# Patient Record
Sex: FEMALE | Race: WHITE | NOT HISPANIC OR LATINO | URBAN - METROPOLITAN AREA
[De-identification: names, ages, dates, MRNs, and addresses within clinical notes are randomized per-mention and may not be internally consistent; named-entity substitution may affect disease eponyms.]

---

## 2019-04-23 ENCOUNTER — TRANSCRIBE ORDERS (OUTPATIENT)
Dept: ADMINISTRATIVE | Facility: HOSPITAL | Age: 39
End: 2019-04-23

## 2019-04-23 DIAGNOSIS — Z12.31 VISIT FOR SCREENING MAMMOGRAM: Primary | ICD-10-CM

## 2022-02-11 ENCOUNTER — TRANSCRIBE ORDERS (OUTPATIENT)
Dept: ADMINISTRATIVE | Facility: HOSPITAL | Age: 42
End: 2022-02-11

## 2022-02-11 DIAGNOSIS — Z12.31 VISIT FOR SCREENING MAMMOGRAM: Primary | ICD-10-CM

## 2022-02-14 ENCOUNTER — APPOINTMENT (OUTPATIENT)
Dept: MAMMOGRAPHY | Facility: HOSPITAL | Age: 42
End: 2022-02-14

## 2022-04-08 ENCOUNTER — HOSPITAL ENCOUNTER (OUTPATIENT)
Dept: MAMMOGRAPHY | Facility: HOSPITAL | Age: 42
Discharge: HOME | End: 2022-04-08
Admitting: OBSTETRICS & GYNECOLOGY

## 2022-04-08 DIAGNOSIS — Z12.31 VISIT FOR SCREENING MAMMOGRAM: ICD-10-CM

## 2022-04-08 PROCEDURE — 77063 BREAST TOMOSYNTHESIS BI: CPT | Performed by: RADIOLOGY

## 2022-04-08 PROCEDURE — 77067 SCR MAMMO BI INCL CAD: CPT

## 2022-04-08 PROCEDURE — 77063 BREAST TOMOSYNTHESIS BI: CPT

## 2022-04-08 PROCEDURE — 77067 SCR MAMMO BI INCL CAD: CPT | Performed by: RADIOLOGY

## 2023-03-28 ENCOUNTER — TRANSCRIBE ORDERS (OUTPATIENT)
Dept: ADMINISTRATIVE | Facility: HOSPITAL | Age: 43
End: 2023-03-28
Payer: COMMERCIAL

## 2023-03-28 DIAGNOSIS — Z12.31 VISIT FOR SCREENING MAMMOGRAM: Primary | ICD-10-CM

## 2023-04-18 ENCOUNTER — HOSPITAL ENCOUNTER (OUTPATIENT)
Dept: MAMMOGRAPHY | Facility: HOSPITAL | Age: 43
Discharge: HOME | End: 2023-04-18
Admitting: OBSTETRICS & GYNECOLOGY
Payer: COMMERCIAL

## 2023-04-18 DIAGNOSIS — Z12.31 VISIT FOR SCREENING MAMMOGRAM: ICD-10-CM

## 2023-04-18 PROCEDURE — 77063 BREAST TOMOSYNTHESIS BI: CPT

## 2023-04-18 PROCEDURE — 77067 SCR MAMMO BI INCL CAD: CPT

## 2023-12-06 ENCOUNTER — HOSPITAL ENCOUNTER (EMERGENCY)
Facility: HOSPITAL | Age: 43
Discharge: HOME | End: 2023-12-06
Attending: EMERGENCY MEDICINE
Payer: COMMERCIAL

## 2023-12-06 ENCOUNTER — OFFICE VISIT (OUTPATIENT)
Dept: GASTROENTEROLOGY | Facility: CLINIC | Age: 43
End: 2023-12-06
Payer: COMMERCIAL

## 2023-12-06 ENCOUNTER — APPOINTMENT (OUTPATIENT)
Dept: CT IMAGING | Facility: HOSPITAL | Age: 43
End: 2023-12-06
Payer: COMMERCIAL

## 2023-12-06 VITALS
HEIGHT: 63 IN | DIASTOLIC BLOOD PRESSURE: 88 MMHG | RESPIRATION RATE: 22 BRPM | TEMPERATURE: 97.8 F | WEIGHT: 175 LBS | SYSTOLIC BLOOD PRESSURE: 124 MMHG | BODY MASS INDEX: 31.01 KG/M2 | OXYGEN SATURATION: 98 % | HEART RATE: 103 BPM

## 2023-12-06 VITALS
HEIGHT: 63 IN | SYSTOLIC BLOOD PRESSURE: 142 MMHG | HEART RATE: 73 BPM | DIASTOLIC BLOOD PRESSURE: 85 MMHG | BODY MASS INDEX: 33.45 KG/M2 | WEIGHT: 188.8 LBS

## 2023-12-06 DIAGNOSIS — K80.50 BILIARY COLIC: ICD-10-CM

## 2023-12-06 DIAGNOSIS — R11.2 NAUSEA AND VOMITING, UNSPECIFIED VOMITING TYPE: ICD-10-CM

## 2023-12-06 DIAGNOSIS — D72.829 LEUKOCYTOSIS, UNSPECIFIED TYPE: ICD-10-CM

## 2023-12-06 DIAGNOSIS — Z87.898 HISTORY OF DIARRHEA: ICD-10-CM

## 2023-12-06 DIAGNOSIS — R11.0 NAUSEA: ICD-10-CM

## 2023-12-06 DIAGNOSIS — K59.00 CONSTIPATION, UNSPECIFIED CONSTIPATION TYPE: ICD-10-CM

## 2023-12-06 DIAGNOSIS — R10.13 DYSPEPSIA: Primary | ICD-10-CM

## 2023-12-06 DIAGNOSIS — R74.8 ELEVATED LIVER ENZYMES: ICD-10-CM

## 2023-12-06 DIAGNOSIS — R10.13 EPIGASTRIC PAIN: Primary | ICD-10-CM

## 2023-12-06 LAB
ALBUMIN SERPL-MCNC: 4 G/DL (ref 3.5–5.2)
ALBUMIN/GLOB SERPL: 1.5 G/DL
ALP SERPL-CCNC: 106 U/L (ref 39–117)
ALT SERPL W P-5'-P-CCNC: 63 U/L (ref 1–33)
ANION GAP SERPL CALCULATED.3IONS-SCNC: 10 MMOL/L (ref 5–15)
AST SERPL-CCNC: 38 U/L (ref 1–32)
B-HCG UR QL: NEGATIVE
BACTERIA UR QL AUTO: ABNORMAL /HPF
BASOPHILS # BLD AUTO: 0.03 10*3/MM3 (ref 0–0.2)
BASOPHILS NFR BLD AUTO: 0.3 % (ref 0–1.5)
BILIRUB SERPL-MCNC: 0.3 MG/DL (ref 0–1.2)
BILIRUB UR QL STRIP: NEGATIVE
BUN SERPL-MCNC: 10 MG/DL (ref 6–20)
BUN/CREAT SERPL: 12.5 (ref 7–25)
CALCIUM SPEC-SCNC: 9.3 MG/DL (ref 8.6–10.5)
CHLORIDE SERPL-SCNC: 103 MMOL/L (ref 98–107)
CHOLEST SERPL-MCNC: 147 MG/DL (ref 0–200)
CLARITY UR: CLEAR
CO2 SERPL-SCNC: 25 MMOL/L (ref 22–29)
COD CRY URNS QL: ABNORMAL /HPF
COLOR UR: YELLOW
CREAT SERPL-MCNC: 0.8 MG/DL (ref 0.57–1)
DEPRECATED RDW RBC AUTO: 40.4 FL (ref 37–54)
EGFRCR SERPLBLD CKD-EPI 2021: 93.9 ML/MIN/1.73
EOSINOPHIL # BLD AUTO: 0.19 10*3/MM3 (ref 0–0.4)
EOSINOPHIL NFR BLD AUTO: 1.7 % (ref 0.3–6.2)
ERYTHROCYTE [DISTWIDTH] IN BLOOD BY AUTOMATED COUNT: 12.9 % (ref 12.3–15.4)
EXPIRATION DATE: NORMAL
GLOBULIN UR ELPH-MCNC: 2.7 GM/DL
GLUCOSE SERPL-MCNC: 98 MG/DL (ref 65–99)
GLUCOSE UR STRIP-MCNC: NEGATIVE MG/DL
HCT VFR BLD AUTO: 39.5 % (ref 34–46.6)
HDLC SERPL-MCNC: 46 MG/DL (ref 40–60)
HGB BLD-MCNC: 13.4 G/DL (ref 12–15.9)
HGB UR QL STRIP.AUTO: ABNORMAL
HOLD SPECIMEN: NORMAL
HYALINE CASTS UR QL AUTO: ABNORMAL /LPF
IMM GRANULOCYTES # BLD AUTO: 0.05 10*3/MM3 (ref 0–0.05)
IMM GRANULOCYTES NFR BLD AUTO: 0.5 % (ref 0–0.5)
INTERNAL NEGATIVE CONTROL: NEGATIVE
INTERNAL POSITIVE CONTROL: POSITIVE
KETONES UR QL STRIP: ABNORMAL
LDLC SERPL CALC-MCNC: 69 MG/DL (ref 0–100)
LDLC/HDLC SERPL: 1.36 {RATIO}
LEUKOCYTE ESTERASE UR QL STRIP.AUTO: NEGATIVE
LIPASE SERPL-CCNC: 29 U/L (ref 13–60)
LYMPHOCYTES # BLD AUTO: 2.17 10*3/MM3 (ref 0.7–3.1)
LYMPHOCYTES NFR BLD AUTO: 19.7 % (ref 19.6–45.3)
Lab: NORMAL
MCH RBC QN AUTO: 29.6 PG (ref 26.6–33)
MCHC RBC AUTO-ENTMCNC: 33.9 G/DL (ref 31.5–35.7)
MCV RBC AUTO: 87.2 FL (ref 79–97)
MONOCYTES # BLD AUTO: 0.62 10*3/MM3 (ref 0.1–0.9)
MONOCYTES NFR BLD AUTO: 5.6 % (ref 5–12)
NEUTROPHILS NFR BLD AUTO: 7.93 10*3/MM3 (ref 1.7–7)
NEUTROPHILS NFR BLD AUTO: 72.2 % (ref 42.7–76)
NITRITE UR QL STRIP: NEGATIVE
NRBC BLD AUTO-RTO: 0 /100 WBC (ref 0–0.2)
PH UR STRIP.AUTO: 5.5 [PH] (ref 5–8)
PLATELET # BLD AUTO: 333 10*3/MM3 (ref 140–450)
PMV BLD AUTO: 9.3 FL (ref 6–12)
POTASSIUM SERPL-SCNC: 3.1 MMOL/L (ref 3.5–5.2)
PROT SERPL-MCNC: 6.7 G/DL (ref 6–8.5)
PROT UR QL STRIP: NEGATIVE
RBC # BLD AUTO: 4.53 10*6/MM3 (ref 3.77–5.28)
RBC # UR STRIP: ABNORMAL /HPF
REF LAB TEST METHOD: ABNORMAL
SODIUM SERPL-SCNC: 138 MMOL/L (ref 136–145)
SP GR UR STRIP: >=1.03 (ref 1–1.03)
SQUAMOUS #/AREA URNS HPF: ABNORMAL /HPF
TRIGL SERPL-MCNC: 192 MG/DL (ref 0–150)
UROBILINOGEN UR QL STRIP: ABNORMAL
VLDLC SERPL-MCNC: 32 MG/DL (ref 5–40)
WBC # UR STRIP: ABNORMAL /HPF
WBC NRBC COR # BLD AUTO: 10.99 10*3/MM3 (ref 3.4–10.8)
WHOLE BLOOD HOLD COAG: NORMAL
WHOLE BLOOD HOLD SPECIMEN: NORMAL

## 2023-12-06 PROCEDURE — 80061 LIPID PANEL: CPT | Performed by: NURSE PRACTITIONER

## 2023-12-06 PROCEDURE — 74176 CT ABD & PELVIS W/O CONTRAST: CPT

## 2023-12-06 PROCEDURE — 85025 COMPLETE CBC W/AUTO DIFF WBC: CPT | Performed by: EMERGENCY MEDICINE

## 2023-12-06 PROCEDURE — 25010000002 ONDANSETRON PER 1 MG: Performed by: EMERGENCY MEDICINE

## 2023-12-06 PROCEDURE — 81001 URINALYSIS AUTO W/SCOPE: CPT | Performed by: EMERGENCY MEDICINE

## 2023-12-06 PROCEDURE — 36415 COLL VENOUS BLD VENIPUNCTURE: CPT | Performed by: EMERGENCY MEDICINE

## 2023-12-06 PROCEDURE — 25010000002 MORPHINE PER 10 MG: Performed by: EMERGENCY MEDICINE

## 2023-12-06 PROCEDURE — 99284 EMERGENCY DEPT VISIT MOD MDM: CPT

## 2023-12-06 PROCEDURE — 80053 COMPREHEN METABOLIC PANEL: CPT | Performed by: EMERGENCY MEDICINE

## 2023-12-06 PROCEDURE — 83690 ASSAY OF LIPASE: CPT | Performed by: EMERGENCY MEDICINE

## 2023-12-06 PROCEDURE — 81025 URINE PREGNANCY TEST: CPT | Performed by: EMERGENCY MEDICINE

## 2023-12-06 PROCEDURE — 96375 TX/PRO/DX INJ NEW DRUG ADDON: CPT

## 2023-12-06 PROCEDURE — 25010000002 HALOPERIDOL LACTATE PER 5 MG: Performed by: EMERGENCY MEDICINE

## 2023-12-06 PROCEDURE — 96374 THER/PROPH/DIAG INJ IV PUSH: CPT

## 2023-12-06 RX ORDER — HYDROCODONE BITARTRATE AND ACETAMINOPHEN 5; 325 MG/1; MG/1
1 TABLET ORAL EVERY 8 HOURS PRN
Qty: 9 TABLET | Refills: 0 | Status: SHIPPED | OUTPATIENT
Start: 2023-12-06 | End: 2023-12-09

## 2023-12-06 RX ORDER — HALOPERIDOL 5 MG/ML
5 INJECTION INTRAMUSCULAR ONCE
Status: COMPLETED | OUTPATIENT
Start: 2023-12-06 | End: 2023-12-06

## 2023-12-06 RX ORDER — SODIUM CHLORIDE 9 MG/ML
10 INJECTION INTRAVENOUS AS NEEDED
Status: DISCONTINUED | OUTPATIENT
Start: 2023-12-06 | End: 2023-12-06 | Stop reason: HOSPADM

## 2023-12-06 RX ORDER — SODIUM CHLORIDE 0.9 % (FLUSH) 0.9 %
10 SYRINGE (ML) INJECTION AS NEEDED
Status: DISCONTINUED | OUTPATIENT
Start: 2023-12-06 | End: 2023-12-06 | Stop reason: HOSPADM

## 2023-12-06 RX ORDER — ONDANSETRON 4 MG/1
4 TABLET, ORALLY DISINTEGRATING ORAL EVERY 8 HOURS PRN
Qty: 15 TABLET | Refills: 0 | Status: SHIPPED | OUTPATIENT
Start: 2023-12-06 | End: 2023-12-11

## 2023-12-06 RX ORDER — OMEPRAZOLE 20 MG/1
20 CAPSULE, DELAYED RELEASE ORAL DAILY
Qty: 90 CAPSULE | Refills: 3 | Status: SHIPPED | OUTPATIENT
Start: 2023-12-06

## 2023-12-06 RX ORDER — MORPHINE SULFATE 4 MG/ML
4 INJECTION, SOLUTION INTRAMUSCULAR; INTRAVENOUS ONCE
Status: COMPLETED | OUTPATIENT
Start: 2023-12-06 | End: 2023-12-06

## 2023-12-06 RX ORDER — ONDANSETRON 2 MG/ML
4 INJECTION INTRAMUSCULAR; INTRAVENOUS ONCE
Status: COMPLETED | OUTPATIENT
Start: 2023-12-06 | End: 2023-12-06

## 2023-12-06 RX ORDER — FAMOTIDINE 20 MG/1
20 TABLET, FILM COATED ORAL 2 TIMES DAILY
Qty: 28 TABLET | Refills: 0 | Status: SHIPPED | OUTPATIENT
Start: 2023-12-06 | End: 2023-12-20

## 2023-12-06 RX ADMIN — HALOPERIDOL LACTATE 5 MG: 5 INJECTION, SOLUTION INTRAMUSCULAR at 04:45

## 2023-12-06 RX ADMIN — MORPHINE SULFATE 4 MG: 4 INJECTION, SOLUTION INTRAMUSCULAR; INTRAVENOUS at 03:18

## 2023-12-06 RX ADMIN — ONDANSETRON 4 MG: 2 INJECTION INTRAMUSCULAR; INTRAVENOUS at 03:17

## 2023-12-06 NOTE — PATIENT INSTRUCTIONS
Follow a diet as recommended by your health care provider. This may involve avoiding foods and drinks such as:  Coffee and tea (with or without caffeine).  Drinks that contain alcohol.  Energy drinks and sports drinks.  Carbonated drinks or sodas.  Chocolate and cocoa.  Peppermint and mint flavorings.  Garlic and onions.  Horseradish.  Spicy and acidic foods, including peppers, chili powder, hoffman powder, vinegar, hot sauces, and barbecue sauce.  Citrus fruit juices and citrus fruits, such as oranges, edie, and limes.  Tomato-based foods, such as red sauce, chili, salsa, and pizza with red sauce.  Fried and fatty foods, such as donuts, french fries, potato chips, and high-fat dressings.    Eat small, frequent meals instead of large meals.  Avoid drinking large amounts of liquid with your meals.  Avoid eating meals during the 2-3 hours before bedtime.  Avoid lying down right after you eat.    Take omeprazole 20 mg daily, 30 minutes prior to intake of calories. Take famotidine twice daily as needed.     Avoid all nonsteroidal anti-inflammatory medications including but not limited to diclofenac (Voltaren), ibuprofen (Motrin, Advil), naproxen (Aleve), sulindac, indomethacin, Celebrex, meloxicam (Mobic). Consider acetaminophen as needed     Take one dose of Miralax mixed 8 ounce of liquid up to 3 times daily.     Call [phone] option 3 then option 4 to get to Annabella or her voicemail if any needs prior to next visit.

## 2023-12-06 NOTE — ED PROVIDER NOTES
Subjective   History of Present Illness  Is a 43-year-old female presented to the emergency department with some epigastric pain nausea and vomiting.  Patient had the symptoms for the last 3 days.  She states that symptoms intensified this evening which prompted her to come the emergency department.  She states that it is a sharp stabbing pain in his epigastric region which radiates to her back.  She has been nauseous.  Vomited multiple times.  No hematemesis.  No diarrhea.  No dysuria or hematuria.  No fevers or chills.  No headache or change in vision.  No focal weakness.  No chest pain or shortness of breath.    History provided by:  Patient   used: No        Review of Systems   Constitutional:  Negative for chills and fever.   HENT:  Negative for congestion, ear pain and sore throat.    Eyes:  Negative for visual disturbance.   Respiratory:  Negative for shortness of breath.    Cardiovascular:  Negative for chest pain.   Gastrointestinal:  Positive for abdominal pain, nausea and vomiting.   Genitourinary:  Negative for difficulty urinating.   Musculoskeletal:  Negative for arthralgias.   Skin:  Negative for rash.   Neurological:  Negative for dizziness, weakness and numbness.   Psychiatric/Behavioral:  Negative for agitation.        History reviewed. No pertinent past medical history.    No Known Allergies    Past Surgical History:   Procedure Laterality Date    BREAST BIOPSY      REDUCTION MAMMAPLASTY         Family History   Problem Relation Age of Onset    Breast cancer Maternal Great-Grandmother         60' or 70's    Ovarian cancer Neg Hx        Social History     Socioeconomic History    Marital status:            Objective   Physical Exam  Vitals and nursing note reviewed.   Constitutional:       General: She is not in acute distress.     Appearance: She is not ill-appearing or toxic-appearing.   HENT:      Mouth/Throat:      Pharynx: No posterior oropharyngeal erythema.   Eyes:       Conjunctiva/sclera: Conjunctivae normal.      Pupils: Pupils are equal, round, and reactive to light.   Cardiovascular:      Rate and Rhythm: Normal rate and regular rhythm.   Pulmonary:      Effort: Pulmonary effort is normal. No respiratory distress.   Abdominal:      General: Abdomen is flat. There is no distension.      Palpations: There is no mass.      Tenderness: There is abdominal tenderness in the epigastric area. There is no guarding or rebound.   Musculoskeletal:         General: No deformity. Normal range of motion.   Skin:     General: Skin is warm.      Findings: No rash.   Neurological:      General: No focal deficit present.      Mental Status: She is alert and oriented to person, place, and time.      Motor: No weakness.         Procedures           ED Course  ED Course as of 12/06/23 0531   Wed Dec 06, 2023   0348 BP: 124/88 [JK]   0349 Temp: 97.8 °F (36.6 °C) [JK]   0349 Temp src: Oral [JK]   0349 Heart Rate: 103 [JK]   0349 Resp: 22 [JK]   0349 SpO2: 98 % [JK]   0349 Device (Oxygen Therapy): room air  Patient's repeat vitals, telemetry tracing, and pulse oximetry tracing were directly viewed and interpreted by myself.  Patient is slightly tachycardic with a heart rate of 103 bpm [JK]   0349 CT Abdomen Pelvis Without Contrast  Imaging was directly visualized by myself, per my interpretations, CT of the abdomen and pelvis was normal.. [JK]   0529 Urinalysis With Microscopic If Indicated (No Culture) - Urine, Clean Catch(!) [JK]   0529 Urinalysis, Microscopic Only - Urine, Clean Catch(!) [JK]   0529 POC Urine Pregnancy [JK]   0529 Comprehensive Metabolic Panel(!) [JK]   0529 Lipase [JK]   0529 CBC & Differential(!)  Laboratory studies were reviewed and interpreted directly by myself.  Urinalysis shows some contamination, lipase normal, CBC unremarkable, CMP was unremarkable, pregnancy negative [JK]   0529 Patient symptoms seem most consistent with dyspepsia, however there is concern for biliary  colic as well.  Patient benefit from follow-up with GI. [JK]   0509 On reevaluation, the patient states that they are feeling much better.  Patient tolerated by mouth challenge of juice and crackers without difficulty.  They are not complaining of any new abdominal pain.  Repeat examination did not show any significant guarding or rebound.  No evidence of peritonitis.  No acute no tenderness.  Patient was given strict return precautions for acute abdomen.  They need to be reevaluated within 24 hours for acute abdomen by PCP or return to the emergency department for reexamination.   [JK]   0543 I had a discussion with the patient/family regarding diagnosis, diagnostic results, treatment plan, and medications. The patient/family indicated understanding of these instructions. I spent adequate time at the bedside prior to discharge necessary to discuss the aftercare instructions, giving patient education, providing explanations of the results of our evaluations/findings, and my decision making to assure that the patient/family understand the plan of care. Time was allotted to answer questions at that time and throughout the ED course. Patient is required to maintain timely follow up, as discussed. I also discussed the potential for the development of an acute emergent condition requiring further evaluation, return to the ER, admission, or even surgical intervention.  I encouraged the patient to return to the emergency department immediately for any concerns, worsening symptoms, new complaints, or if symptoms persist and they are unable to seek follow-up in a timely fashion. The patient/family expressed understanding and agreement with this plan    Shared decision making:   After full review of the patient's clinical presentation, review of any work-up including but not limited to laboratory studies and radiology obtained, I had a discussion with the patient.  Treatment options were discussed as well as the risks,  benefits and consequences.  I discussed all findings with the patient and family members if available.  During the discussion, treatment goals were understood by all as well as any misconceptions which were addressed with the patient.  Ample time was given for any questions they may have had.  They are in agreement with the treatment plan as well as final disposition. [JK]      ED Course User Index  [JK] Iain Bain MD                                     [facility] reviewed by Iain Bain MD       Medical Decision Making  This is a 43-year-old female presenting to the emergency department with some epigastric discomfort, nausea and vomiting.  The patient's symptoms seem consistent with GERD versus dyspepsia.  There is also concern for pancreatitis.  Overall the patient's abdominal examination shows some moderate tenderness in the epigastric region.  However there is no evidence of peritonitis or acute abdomen.  IV access will be established and the patient.  Provided symptomatic treatment.  Workup initiated.      Differential diagnosis: Peptic ulcer disease, dyspepsia, GERD, pancreatitis, biliary colic, electrolyte abnormality, acute kidney injury      Amount and/or Complexity of Data Reviewed  External Data Reviewed: radiology and notes.     Details: External laboratories, imaging as well as notes were reviewed personally by myself.  All relevant studies were used to guide decision making.     Date of previous record: 7/15/2022    Source of note: Outside emergency record    Summary: Patient was seen for some neck pain.  I did review a CT of the cervical spine on file.  Records reviewed.    Labs: ordered. Decision-making details documented in ED Course.  Radiology: ordered and independent interpretation performed. Decision-making details documented in ED Course.    Risk  Prescription drug management.        Final diagnoses:   Dyspepsia   Nausea and vomiting, unspecified vomiting type   Biliary colic        ED Disposition  ED Disposition       ED Disposition   Discharge    Condition   Stable    Comment   --               Manuel Nix MD  [address] [id number] [phone]    Call in 1 day           Medication List        New Prescriptions      famotidine 20 MG tablet  Commonly known as: PEPCID  Take 1 tablet by mouth 2 (Two) Times a Day for 14 days.     HYDROcodone-acetaminophen 5-325 MG per tablet  Commonly known as: NORCO  Take 1 tablet by mouth Every 8 (Eight) Hours As Needed for Moderate Pain for up to 3 days.     ondansetron ODT 4 MG disintegrating tablet  Commonly known as: ZOFRAN-ODT  Place 1 tablet on the tongue Every 8 (Eight) Hours As Needed for Nausea or Vomiting for up to 5 days.               Where to Get Your Medications        These medications were sent to [facility]/pharmacy #[id number] - [place], [place] - [address] [place] [address] AT [facility] OF [facility] - [phone]  - [phone] [address] [address]      Hours: 24-hours Phone: [phone]   famotidine 20 MG tablet  HYDROcodone-acetaminophen 5-325 MG per tablet  ondansetron ODT 4 MG disintegrating tablet            Iain Bain MD  12/06/23 8971

## 2023-12-06 NOTE — PROGRESS NOTES
GASTROENTEROLOGY OFFICE NOTE    Bettina Goss  [id number]  1980    CARE TEAM  Patient Care Team:  Zechariah Starr MD as PCP - General  Zechariah Starr MD as PCP - Family Medicine  Glory Robles APRN as Nurse Practitioner (Gastroenterology)    Referring Provider: No ref. provider found    Chief Complaint   Patient presents with    Abdominal Pain     New patient. Pt seen & treated in ER today. States she's been having abdominal pain since 1am, with some nausea & dry heaves. Diarrhea x3days.         HISTORY OF PRESENT ILLNESS:   Bettina Goss is a 43 y.o. female who presents to the clinic today for ED follow up. She  presented to the emergency department at [facility] this morning with complaints of epigastric pain, nausea, dry heaves that began 3 days prior.  Review of documentation by emergency department provider revealed  patient symptoms seem consistent with dyspepsia, however, there is concern for biliary colic as well.  There is also concern for pancreatitis.  She was discharged with diagnoses of dyspepsia, nausea with vomiting, biliary colic.  Famotidine, hydrocodone, ondansetron prescribed.    12/6/2023 CT abdomen and pelvis without acute findings, CT revealed tiny bilateral nonobstructing renal stones, stool throughout the colon consistent with constipation, small left ovarian cyst.    She is accompanied by her mother who provides some information and would like to know why the ED provider mentioned patient needed ERCP.     She reports 5 to 6 days ago she had diarrhea that was present for approximately 3 days.  She then started to experience nausea, dry heaves.  She has epigastric abdominal pain.    She has history of irritable bowel syndrome, was previously established with Dr. Diallo.  She reports colonoscopy approximately 10 years ago.  She was previously prescribed Zelnorm and then Linzess 145 mcg daily.  She reports she did well taking Linzess but due to feeling  "as though symptoms resolved she discontinued use.    Her mother has history of diverticulitis, abscess, colon resection.  Her maternal grandmother has history of polyps at unknown age.    Past Medical History:   Diagnosis Date    Irritable bowel syndrome         Past Surgical History:   Procedure Laterality Date    ABDOMINOPLASTY      BREAST BIOPSY       SECTION      COLONOSCOPY      Dr. Diallo    FOOT SURGERY      REDUCTION MAMMAPLASTY          Current Outpatient Medications on File Prior to Visit   Medication Sig    famotidine (PEPCID) 20 MG tablet Take 1 tablet by mouth 2 (Two) Times a Day for 14 days.    HYDROcodone-acetaminophen (NORCO) 5-325 MG per tablet Take 1 tablet by mouth Every 8 (Eight) Hours As Needed for Moderate Pain for up to 3 days.    ondansetron ODT (ZOFRAN-ODT) 4 MG disintegrating tablet Place 1 tablet on the tongue Every 8 (Eight) Hours As Needed for Nausea or Vomiting for up to 5 days.     Current Facility-Administered Medications on File Prior to Visit   Medication    [COMPLETED] haloperidol lactate (HALDOL) injection 5 mg    [COMPLETED] Morphine sulfate (PF) injection 4 mg    [COMPLETED] ondansetron (ZOFRAN) injection 4 mg    [DISCONTINUED] Sodium Chloride (PF) 0.9 % 10 mL    [DISCONTINUED] sodium chloride 0.9 % flush 10 mL       No Known Allergies    Family History   Problem Relation Age of Onset    Breast cancer Maternal Great-Grandmother         60' or 70's    Ovarian cancer Neg Hx        Social History     Socioeconomic History    Marital status:    Tobacco Use    Smoking status: Never    Smokeless tobacco: Never   Vaping Use    Vaping Use: Never used   Substance and Sexual Activity    Alcohol use: Yes     Comment: occasionally    Drug use: Never    Sexual activity: Defer       PHYSICAL EXAM   /85 (BP Location: Left arm, Patient Position: Lying, Cuff Size: Adult)   Pulse 73   Ht 160 cm (63\")   Wt 85.6 kg (188 lb 12.8 oz)   BMI 33.44 kg/m²   Physical " Exam  Constitutional:       General: She is not in acute distress.     Appearance: She is ill-appearing.   HENT:      Head: Normocephalic and atraumatic. No contusion.      Right Ear: External ear normal.      Left Ear: External ear normal.   Eyes:      General: Lids are normal. No scleral icterus.        Right eye: No discharge.         Left eye: No discharge.      Extraocular Movements: Extraocular movements intact.   Neck:      Trachea: Trachea normal.      Comments: No visible mass  No visible adenopathy  Cardiovascular:      Rate and Rhythm: Normal rate.   Pulmonary:      Effort: No respiratory distress.      Comments: Symmetrical expansion    Abdominal:      Palpations: Abdomen is soft. There is no mass.      Tenderness: There is generalized abdominal tenderness.   Musculoskeletal:      Comments: Symmetrical movement of upper extremities  Symmetrical movement of lower extremities  No visible deformities   Skin:     General: Skin is warm and dry.      Coloration: Skin is not jaundiced.   Neurological:      General: No focal deficit present.      Mental Status: She is alert and oriented to person, place, and time.   Psychiatric:         Mood and Affect: Mood normal.         Behavior: Behavior normal.         Thought Content: Thought content normal.         Results Review:  12/6/2023 CT abdomen and pelvis without acute findings, CT revealed tiny bilateral nonobstructing renal stones, stool throughout the colon consistent with constipation, small left ovarian cyst.  CMP          12/6/2023    02:54   CMP   Glucose 98    BUN 10    Creatinine 0.80    EGFR 93.9    Sodium 138    Potassium 3.1    Chloride 103    Calcium 9.3    Total Protein 6.7    Albumin 4.0    Globulin 2.7    Total Bilirubin 0.3    Alkaline Phosphatase 106    AST (SGOT) 38    ALT (SGPT) 63    Albumin/Globulin Ratio 1.5    BUN/Creatinine Ratio 12.5    Anion Gap 10.0      CBC          12/6/2023    02:54   CBC   WBC 10.99    RBC 4.53    Hemoglobin 13.4     Hematocrit 39.5    MCV 87.2    MCH 29.6    MCHC 33.9    RDW 12.9    Platelets 333    12/6/2023 lipase normal.    ASSESSMENT / PLAN  1. Epigastric pain  -Constipation possibly contributing, treatment as below  -Possible viral syndrome, recommend liquid only diet for up to 48 hours.  If she is unable to tolerate liquids at home she needs to return to emergency department for evaluation.  Reevaluate symptoms every 12 hours and if symptom improvement she may advance diet to bland foods such as bananas, rice, applesauce, toast  -Do not suspect pancreatitis at this time with normal lipase and imaging without changes of pancreatitis but we did discuss possible etiologies of pancreatitis to include alcohol which she denies drinking for a while, hypertriglyceridemia for which a lipid panel was added onto labs drawn in the emergency department today, gallstones for which ultrasound of the gallbladder was ordered.  -Plan for EGD for additional evaluation  -Start omeprazole daily  -Lifestyle modifications for reflux provided  -Avoid NSAIDs, consider acetaminophen as needed  - US Gallbladder; Future  - omeprazole (priLOSEC) 20 MG capsule; Take 1 capsule by mouth Daily. 30 minutes prior to intake of calories  Dispense: 90 capsule; Refill: 3  - Lipid Panel    2. History of diarrhea  -Recent CT scan with retained stool concerning for constipation, treatment as below.    3. Constipation, unspecified constipation type  -History of taking Zelnorm followed by Linzess but she discontinued use of Linzess 145 mcg due to improvement in symptoms  -Start MiraLAX up to 3 times daily as needed  -Constipation possibly contributing to abdominal pain and nausea    4. Nausea  -Plan for EGD for additional evaluation  -Possible viral syndrome with recent diarrhea but now with constipation possibly contributing to nausea  -Start omeprazole 20 mg daily, take famotidine up to 2 times daily as needed, Zofran as needed which was prescribed at the  emergency room visit    5. Elevated liver enzymes  -Possible viral syndrome, repeat CMP that includes liver enzymes  - Comprehensive Metabolic Panel; Future    6. Leukocytosis, unspecified type  -Possible viral syndrome contributing to leukocytosis but she did have leukocytosis 1 year ago as well.  Repeat CBC in approximately 1 week to reevaluate  - CBC (No Diff); Future      Return for Follow-up after EGD.    Glory Robles, MAGDALENA  12/06/2023

## 2023-12-07 ENCOUNTER — OUTSIDE FACILITY SERVICE (OUTPATIENT)
Dept: GASTROENTEROLOGY | Facility: CLINIC | Age: 43
End: 2023-12-07
Payer: COMMERCIAL

## 2023-12-21 RX ORDER — SODIUM, POTASSIUM,MAG SULFATES 17.5-3.13G
SOLUTION, RECONSTITUTED, ORAL ORAL
Qty: 354 ML | Refills: 0 | Status: SHIPPED | OUTPATIENT
Start: 2023-12-21

## 2023-12-22 DIAGNOSIS — R10.13 EPIGASTRIC PAIN: Primary | ICD-10-CM

## 2024-01-04 ENCOUNTER — OUTSIDE FACILITY SERVICE (OUTPATIENT)
Dept: GASTROENTEROLOGY | Facility: CLINIC | Age: 44
End: 2024-01-04
Payer: COMMERCIAL

## 2024-01-04 PROCEDURE — 45378 DIAGNOSTIC COLONOSCOPY: CPT | Performed by: INTERNAL MEDICINE

## 2024-01-09 ENCOUNTER — HOSPITAL ENCOUNTER (OUTPATIENT)
Dept: ULTRASOUND IMAGING | Facility: HOSPITAL | Age: 44
Discharge: HOME | End: 2024-01-09
Admitting: NURSE PRACTITIONER
Payer: COMMERCIAL

## 2024-01-09 DIAGNOSIS — R10.13 EPIGASTRIC PAIN: ICD-10-CM

## 2024-01-09 PROCEDURE — 76705 ECHO EXAM OF ABDOMEN: CPT

## 2024-01-19 ENCOUNTER — TELEPHONE (OUTPATIENT)
Dept: GASTROENTEROLOGY | Facility: CLINIC | Age: 44
End: 2024-01-19
Payer: COMMERCIAL

## 2024-01-19 DIAGNOSIS — K82.8 SLUDGE IN GALLBLADDER: ICD-10-CM

## 2024-01-19 DIAGNOSIS — R11.0 NAUSEA: ICD-10-CM

## 2024-01-19 DIAGNOSIS — R10.13 EPIGASTRIC PAIN: Primary | ICD-10-CM

## 2024-01-19 DIAGNOSIS — K80.20 CALCULUS OF GALLBLADDER WITHOUT CHOLECYSTITIS WITHOUT OBSTRUCTION: ICD-10-CM

## 2024-01-19 NOTE — TELEPHONE ENCOUNTER
Caller: Bettina Goss    Relationship to patient: Self    Best call back number: [phone]    Patient is needing: PATIENT REQUEST TO BE CALLED BACK TO DISCUSS POSSIBLE REFERRAL TO GENERAL SURGERY. IS THIS SOMETHING YOU ARE RECOMMENDING SHE HAVE DONE TO HELP ELEVATE SOME OF HER ISSUES. PLEASE CALL HER TO DISCUSS AS SOON AS POSSIBLE

## 2024-01-19 NOTE — TELEPHONE ENCOUNTER
HUB MAY READ: I called [phone]  as requested, no answer, unable to leave Voicemail due to recording that mailbox is full. In regard to ultrasound result note, ultrasound revealed  fatty appearance of the liver, weight loss of 10% of total body weight or approximately 18 pounds would be helpful.      Ultrasound also revealed gallstones and sludge in the gallbladder. Try to avoid greasy, fatty, fried foods. I will refer you to general surgery for evaluation due to history of gastrointestinal symptoms that could be due to symptomatic gallstones and/o sludge. You should be contacted by our office or the general surgeon's office for an appointment in the near future.     If you have additional questions please consider sending a my chart message or returning phone call. I will be leaving soon today but will return on Monday.

## 2024-01-22 NOTE — TELEPHONE ENCOUNTER
N/A @ 419pm, unable to leave message, due to full mailbox. [employer] message sent to patient.

## 2024-01-30 ENCOUNTER — DOCUMENTATION (OUTPATIENT)
Dept: GASTROENTEROLOGY | Facility: CLINIC | Age: 44
End: 2024-01-30
Payer: COMMERCIAL

## 2025-07-31 ENCOUNTER — TRANSCRIBE ORDERS (OUTPATIENT)
Dept: ADMINISTRATIVE | Facility: HOSPITAL | Age: 45
End: 2025-07-31
Payer: COMMERCIAL

## 2025-07-31 DIAGNOSIS — Z12.31 VISIT FOR SCREENING MAMMOGRAM: Primary | ICD-10-CM

## 2025-08-28 ENCOUNTER — HOSPITAL ENCOUNTER (OUTPATIENT)
Dept: MAMMOGRAPHY | Facility: HOSPITAL | Age: 45
Discharge: HOME | End: 2025-08-28
Admitting: OBSTETRICS & GYNECOLOGY
Payer: COMMERCIAL

## 2025-08-28 DIAGNOSIS — Z12.31 BREAST CANCER SCREENING BY MAMMOGRAM: ICD-10-CM

## 2025-08-28 PROCEDURE — 77067 SCR MAMMO BI INCL CAD: CPT

## 2025-08-28 PROCEDURE — 77063 BREAST TOMOSYNTHESIS BI: CPT
